# Patient Record
Sex: FEMALE | Race: OTHER
[De-identification: names, ages, dates, MRNs, and addresses within clinical notes are randomized per-mention and may not be internally consistent; named-entity substitution may affect disease eponyms.]

---

## 2019-01-22 ENCOUNTER — HOSPITAL ENCOUNTER (INPATIENT)
Dept: HOSPITAL 15 - ER | Age: 34
LOS: 3 days | Discharge: HOME | DRG: 872 | End: 2019-01-25
Attending: INTERNAL MEDICINE | Admitting: NURSE PRACTITIONER
Payer: SELF-PAY

## 2019-01-22 VITALS — WEIGHT: 184.75 LBS | HEIGHT: 67 IN | BODY MASS INDEX: 29 KG/M2

## 2019-01-22 DIAGNOSIS — L02.413: ICD-10-CM

## 2019-01-22 DIAGNOSIS — I10: ICD-10-CM

## 2019-01-22 DIAGNOSIS — I89.0: ICD-10-CM

## 2019-01-22 DIAGNOSIS — L03.113: ICD-10-CM

## 2019-01-22 DIAGNOSIS — A41.01: Primary | ICD-10-CM

## 2019-01-22 LAB
ALBUMIN SERPL-MCNC: 3 G/DL (ref 3.4–5)
ALCOHOL, URINE: < 3 MG/DL (ref 0–5)
ALP SERPL-CCNC: 75 U/L (ref 45–117)
ALT SERPL-CCNC: 37 U/L (ref 13–56)
AMPHETAMINES UR QL SCN: NEGATIVE
ANION GAP SERPL CALCULATED.3IONS-SCNC: 7 MMOL/L (ref 5–15)
BARBITURATES UR QL SCN: POSITIVE
BENZODIAZ UR QL SCN: NEGATIVE
BILIRUB SERPL-MCNC: 0.4 MG/DL (ref 0.2–1)
BUN SERPL-MCNC: 10 MG/DL (ref 7–18)
BUN/CREAT SERPL: 9.7
BZE UR QL SCN: NEGATIVE
CALCIUM SERPL-MCNC: 8.7 MG/DL (ref 8.5–10.1)
CANNABINOIDS UR QL SCN: NEGATIVE
CHLORIDE SERPL-SCNC: 103 MMOL/L (ref 98–107)
CO2 SERPL-SCNC: 26 MMOL/L (ref 21–32)
GLUCOSE SERPL-MCNC: 115 MG/DL (ref 74–106)
HCT VFR BLD AUTO: 37.9 % (ref 36–46)
HGB BLD-MCNC: 12.8 G/DL (ref 12.2–16.2)
MCH RBC QN AUTO: 27.1 PG (ref 28–32)
MCV RBC AUTO: 80.2 FL (ref 80–100)
NRBC BLD QL AUTO: 0 %
OPIATES UR QL SCN: POSITIVE
PCP UR QL SCN: NEGATIVE
POTASSIUM SERPL-SCNC: 3.9 MMOL/L (ref 3.5–5.1)
PROT SERPL-MCNC: 7.4 G/DL (ref 6.4–8.2)
SODIUM SERPL-SCNC: 136 MMOL/L (ref 136–145)

## 2019-01-22 PROCEDURE — 87205 SMEAR GRAM STAIN: CPT

## 2019-01-22 PROCEDURE — 83605 ASSAY OF LACTIC ACID: CPT

## 2019-01-22 PROCEDURE — 81025 URINE PREGNANCY TEST: CPT

## 2019-01-22 PROCEDURE — 96365 THER/PROPH/DIAG IV INF INIT: CPT

## 2019-01-22 PROCEDURE — 73090 X-RAY EXAM OF FOREARM: CPT

## 2019-01-22 PROCEDURE — 36415 COLL VENOUS BLD VENIPUNCTURE: CPT

## 2019-01-22 PROCEDURE — 73200 CT UPPER EXTREMITY W/O DYE: CPT

## 2019-01-22 PROCEDURE — 87075 CULTR BACTERIA EXCEPT BLOOD: CPT

## 2019-01-22 PROCEDURE — 87070 CULTURE OTHR SPECIMN AEROBIC: CPT

## 2019-01-22 PROCEDURE — 96367 TX/PROPH/DG ADDL SEQ IV INF: CPT

## 2019-01-22 PROCEDURE — 85610 PROTHROMBIN TIME: CPT

## 2019-01-22 PROCEDURE — 80048 BASIC METABOLIC PNL TOTAL CA: CPT

## 2019-01-22 PROCEDURE — 93971 EXTREMITY STUDY: CPT

## 2019-01-22 PROCEDURE — 80307 DRUG TEST PRSMV CHEM ANLYZR: CPT

## 2019-01-22 PROCEDURE — 81001 URINALYSIS AUTO W/SCOPE: CPT

## 2019-01-22 PROCEDURE — 85025 COMPLETE CBC W/AUTO DIFF WBC: CPT

## 2019-01-22 PROCEDURE — 85730 THROMBOPLASTIN TIME PARTIAL: CPT

## 2019-01-22 PROCEDURE — 80202 ASSAY OF VANCOMYCIN: CPT

## 2019-01-22 PROCEDURE — 87077 CULTURE AEROBIC IDENTIFY: CPT

## 2019-01-22 PROCEDURE — 87186 SC STD MICRODIL/AGAR DIL: CPT

## 2019-01-22 PROCEDURE — 87040 BLOOD CULTURE FOR BACTERIA: CPT

## 2019-01-22 PROCEDURE — 80053 COMPREHEN METABOLIC PANEL: CPT

## 2019-01-23 VITALS — DIASTOLIC BLOOD PRESSURE: 92 MMHG | SYSTOLIC BLOOD PRESSURE: 164 MMHG

## 2019-01-23 VITALS — DIASTOLIC BLOOD PRESSURE: 84 MMHG | SYSTOLIC BLOOD PRESSURE: 132 MMHG

## 2019-01-23 VITALS — SYSTOLIC BLOOD PRESSURE: 126 MMHG | DIASTOLIC BLOOD PRESSURE: 51 MMHG

## 2019-01-23 VITALS — DIASTOLIC BLOOD PRESSURE: 104 MMHG | SYSTOLIC BLOOD PRESSURE: 148 MMHG

## 2019-01-23 LAB
APTT PPP: 28.9 SEC (ref 23.78–33.04)
INR PPP: 0.97 (ref 0.9–1.15)
PROTHROMBIN TIME: 10.4 SEC (ref 9.27–12.13)

## 2019-01-23 PROCEDURE — 0X9D3ZZ DRAINAGE OF RIGHT LOWER ARM, PERCUTANEOUS APPROACH: ICD-10-PCS | Performed by: SURGERY

## 2019-01-23 RX ADMIN — VANCOMYCIN HYDROCHLORIDE SCH MLS/HR: 1 INJECTION, POWDER, LYOPHILIZED, FOR SOLUTION INTRAVENOUS at 22:06

## 2019-01-23 RX ADMIN — FAMOTIDINE SCH MG: 20 TABLET, FILM COATED ORAL at 17:25

## 2019-01-23 RX ADMIN — HYDROCODONE BITARTRATE AND ACETAMINOPHEN PRN TAB: 5; 325 TABLET ORAL at 17:25

## 2019-01-23 RX ADMIN — FAMOTIDINE SCH MG: 20 TABLET, FILM COATED ORAL at 07:14

## 2019-01-23 NOTE — NUR
SURGICAL CONSULT



DR DIAZ AT BED SIDE ASSESSING PT RT FOREARM, AND EXPLAIN THE INCISION AND DRAINAGE 
PROCEDURE TO PATIENT, PT VERBALIS UNDERSTANDING.

## 2019-01-23 NOTE — NUR
IV insertion

IV access obtained, via clean sterile technique by inserting 22 gauge catheter at  after  
attempt(s). IV secured properly. No trauma to site. Patient tolerated procedure well.

OLD IV REMOVED, PT SATED ITS HURTING ME

## 2019-01-23 NOTE — NUR
ASSUMED PATIENT CARE- NOC SHIFT

PATIENT IS ALERT AND ORIENTED X4, ANSWERS IN COMPLETE SENTENCES AND MAKES APPROPRIATE EYE 
CONTACT. PATIENT DRESSING TO RIGHT ARM IS DRY AND INTACT. PATIENT DENIES PAIN AT THIS TIME. 
PATIENT IS IN BED, BED IS LOCKED IN LOWEST POSITION. BED RAILS UP X2 AND HEAD OF BED IS UP 
<30 DEGREES FOR SAFETY PRECAUTIONS. BEDSIDE TABLE WITHIN REACH, CALL LIGHT WITHIN REACH. 
INSTRUCTED PATIENT TO CALL PRN. WILL CONTINUE TO MONITOR Q1H AND PRN.

## 2019-01-23 NOTE — NUR
PT IS BACK TO HER ROOM, BY HOSPITAL BED, ALERT ORIENTED X4, DRY KERLIX NOTED ON HER RT 
FOREARM, VITAL SIGNS STABLE, 

PAIN 4/10, CALL LIGHT WITHIN REACH.

## 2019-01-24 VITALS — DIASTOLIC BLOOD PRESSURE: 80 MMHG | SYSTOLIC BLOOD PRESSURE: 133 MMHG

## 2019-01-24 VITALS — DIASTOLIC BLOOD PRESSURE: 89 MMHG | SYSTOLIC BLOOD PRESSURE: 142 MMHG

## 2019-01-24 VITALS — SYSTOLIC BLOOD PRESSURE: 126 MMHG | DIASTOLIC BLOOD PRESSURE: 42 MMHG

## 2019-01-24 VITALS — DIASTOLIC BLOOD PRESSURE: 84 MMHG | SYSTOLIC BLOOD PRESSURE: 127 MMHG

## 2019-01-24 VITALS — SYSTOLIC BLOOD PRESSURE: 127 MMHG | DIASTOLIC BLOOD PRESSURE: 79 MMHG

## 2019-01-24 LAB
ANION GAP SERPL CALCULATED.3IONS-SCNC: 6 MMOL/L (ref 5–15)
BUN SERPL-MCNC: 9 MG/DL (ref 7–18)
BUN/CREAT SERPL: 13.2
CALCIUM SERPL-MCNC: 8.7 MG/DL (ref 8.5–10.1)
CHLORIDE SERPL-SCNC: 106 MMOL/L (ref 98–107)
CO2 SERPL-SCNC: 27 MMOL/L (ref 21–32)
GLUCOSE SERPL-MCNC: 100 MG/DL (ref 74–106)
HCT VFR BLD AUTO: 34.5 % (ref 36–46)
HGB BLD-MCNC: 11.7 G/DL (ref 12.2–16.2)
MCH RBC QN AUTO: 26.7 PG (ref 28–32)
MCV RBC AUTO: 78.6 FL (ref 80–100)
NRBC BLD QL AUTO: 0 %
POTASSIUM SERPL-SCNC: 4.3 MMOL/L (ref 3.5–5.1)
SODIUM SERPL-SCNC: 139 MMOL/L (ref 136–145)

## 2019-01-24 RX ADMIN — FAMOTIDINE SCH MG: 20 TABLET, FILM COATED ORAL at 06:21

## 2019-01-24 RX ADMIN — FAMOTIDINE SCH MG: 20 TABLET, FILM COATED ORAL at 16:50

## 2019-01-24 RX ADMIN — VANCOMYCIN HYDROCHLORIDE SCH MLS/HR: 1 INJECTION, POWDER, LYOPHILIZED, FOR SOLUTION INTRAVENOUS at 10:08

## 2019-01-24 RX ADMIN — HYDROCODONE BITARTRATE AND ACETAMINOPHEN PRN TAB: 5; 325 TABLET ORAL at 04:53

## 2019-01-24 RX ADMIN — HYDROCODONE BITARTRATE AND ACETAMINOPHEN PRN TAB: 5; 325 TABLET ORAL at 16:27

## 2019-01-24 RX ADMIN — VANCOMYCIN HYDROCHLORIDE SCH MLS/HR: 1 INJECTION, POWDER, LYOPHILIZED, FOR SOLUTION INTRAVENOUS at 21:56

## 2019-01-24 RX ADMIN — CEFTRIAXONE SODIUM SCH MLS/HR: 1 INJECTION, POWDER, FOR SOLUTION INTRAMUSCULAR; INTRAVENOUS at 10:03

## 2019-01-24 RX ADMIN — HYDROCODONE BITARTRATE AND ACETAMINOPHEN PRN TAB: 5; 325 TABLET ORAL at 21:56

## 2019-01-24 NOTE — NUR
IV insertion

IV access obtained, via clean sterile technique by inserting  22 gauge catheter at  after  2 
attempt(s). IV secured properly. No trauma to site. Patient tolerated well.

## 2019-01-24 NOTE — NUR
IV removal

PATIENT COMPLAINING OF PAIN AT IV SITE TO LEFT FOREARM

IV DC'd with clean sterile technique, catheter fully intact. Pressure dressing applied to 
site. Patient tolerated well.

## 2019-01-24 NOTE — NUR
Patient complaining of redness and itching to IV site, left forearm. Currently infusing 
Rocephin 1 gram. Medication stopped, flushed with 10 ml's of 0.9% NS. no further complaints 
at this time, V/S /89, 88, 20, 97% 98.4. Dr Rodger velez, awaiting return call, 
will continue to monitor Q1 hour and PRN.

## 2019-01-24 NOTE — NUR
ASSUMED PATIENT CARE - NOC SHIFT

PATIENT IS IN BED. BED IS LOCKED IN LOWEST POSITION. BED RAILS UP X2, HEAD OF BED IS UP >30 
DEGREES FOR SAFETY PRECAUTIONS. BEDSIDE TABLE WITHIN REACH, CALL LIGHT WITHIN REACH. 
DISCUSSED POC WITH PATIENT AND INSTRUCTED PATIENT TO CALL PRN; PATIENT VERBALIZED 
UNDERSTANDING. WILL CONTINUE TO MONITOR Q1H AND PRN. PATIENT STATES THAT SHE FEELS BETTER 
TODAY. SHE STATES THAT HER PAIN IS TOLERABLE AT 4/10.

## 2019-01-24 NOTE — NUR
IV removal

IV DC'd to left forearm due to complains of redness and itching, with clean sterile 
technique, catheter fully intact. Pressure dressing applied to site. Patient tolerated well.

IV insertion

IV access obtained, via clean sterile technique by inserting 20 gauge catheter at right 
wrist after 1 attempt. IV secured properly. No trauma to site. Patient tolerated well.

## 2019-01-24 NOTE — NUR
Opening Shift Note

Assumed care of patient, awake, alert and oriented X4.  No S/S of distress/SOB, complains of 
pain, to right upper arm, 3/10, Ryan Ruiz, verbalized tolerable. Right upper arm dressing 
clean, dry and intact. IV to left forearm, 22 gauge, patent and saline locked. Instructed on 
POC and to call for assist PRN, verbalized understanding. Bed locked, in lowest position, 
call light within reach, will continue to monitor for changes Q1hr and PRN.

## 2019-01-24 NOTE — NUR
Return call received from Dr Soares, updated on reason for call, verbalized understanding. 
New orders received and followed through. Patient remains with red line and itching to left 
forearm, s/p Rocephin infusion. Benadryl 50mg IVP administered. Will continue to monitor Q1 
hour and PRN.

## 2019-01-24 NOTE — NUR
WOUND CARE NOTE: 

Wound care in to see patient  per wound care request regarding Rt forearm wounds s/p I&D. 
Patient is 34 years old female with admitting diagnosis of  R Forearm cellulitis. Bedside 
nurse took photograph of patient's wound upon admission for reference. Patient is resting  
in bed in Rm. 234. She's awake, alert and oriented. Patient is premedicated for pain by her 
bedside nurse prior dressing change. She's ambulatory and self turn and reposition. Her 
current Pete score is 21.  



Patient undergone I&D of Rt arm abscess by Dr. Ragsdale yesterday 1/23/19. Dr. Ragsdale order to 
remove wound packing and irrigate wounds with H2O2 TID. Removed wound dressing to patient's 
Rt forearm. Patient's R forearm has two linear incision. Irrigated wounds with Hydrogen 
Peroxide to wet the packing, removed packing and irrigated again with hydrogen peroxide, 
patted dry with sterile gauze. New photograph of wounds are taken for reference. Covered 
wounds with layers of sterile 4x4's gauze, wrapped with Kerlix and secured with tape.  
Patient tolerated well. No further wound care monitoring needed at this time. Bedside nurse 
to continue TID irrigation of wounds per MD order.



RECOMMENDATION: TID/PRN Irrigation of wounds/dressing change to Rt. forearm wounds per MD 
order, skin/wound preventative plan of care.

-------------------------------------------------------------------------------

Addendum: 01/24/19 at 1830 by Elo Leon RN

-------------------------------------------------------------------------------

Amended: Links added.

## 2019-01-24 NOTE — NUR
ROUNDS

Dr Soares at bedside for rounds, new orders received and followed through. Patient updated 
on plan of care, verbalized understanding.

## 2019-01-25 VITALS — DIASTOLIC BLOOD PRESSURE: 99 MMHG | SYSTOLIC BLOOD PRESSURE: 141 MMHG

## 2019-01-25 VITALS — DIASTOLIC BLOOD PRESSURE: 86 MMHG | SYSTOLIC BLOOD PRESSURE: 150 MMHG

## 2019-01-25 VITALS — SYSTOLIC BLOOD PRESSURE: 142 MMHG | DIASTOLIC BLOOD PRESSURE: 73 MMHG

## 2019-01-25 LAB
ANION GAP SERPL CALCULATED.3IONS-SCNC: 8 MMOL/L (ref 5–15)
BUN SERPL-MCNC: 10 MG/DL (ref 7–18)
BUN/CREAT SERPL: 13
CALCIUM SERPL-MCNC: 8.9 MG/DL (ref 8.5–10.1)
CHLORIDE SERPL-SCNC: 105 MMOL/L (ref 98–107)
CO2 SERPL-SCNC: 26 MMOL/L (ref 21–32)
GLUCOSE SERPL-MCNC: 86 MG/DL (ref 74–106)
POTASSIUM SERPL-SCNC: 3.6 MMOL/L (ref 3.5–5.1)
SODIUM SERPL-SCNC: 139 MMOL/L (ref 136–145)

## 2019-01-25 RX ADMIN — CEFTRIAXONE SODIUM SCH MLS/HR: 1 INJECTION, POWDER, FOR SOLUTION INTRAMUSCULAR; INTRAVENOUS at 09:00

## 2019-01-25 RX ADMIN — HYDROCODONE BITARTRATE AND ACETAMINOPHEN PRN TAB: 5; 325 TABLET ORAL at 07:01

## 2019-01-25 RX ADMIN — FAMOTIDINE SCH MG: 20 TABLET, FILM COATED ORAL at 07:00

## 2019-01-25 RX ADMIN — VANCOMYCIN HYDROCHLORIDE SCH MLS/HR: 1 INJECTION, POWDER, LYOPHILIZED, FOR SOLUTION INTRAVENOUS at 10:08

## 2019-01-25 NOTE — NUR
Patient taken to vehicle via wheelchair with all personal belongings, accompanied by staff 
and family member. No distress noted at time of departure.

## 2019-01-25 NOTE — NUR
ROUNDS

PATIENT IS RESTING, EYES CLOSED. BREATHING IS EVEN AND UNLABORED. WILL CONTINUE TO MONITOR 
Q1H AND PRN.

## 2019-01-25 NOTE — NUR
Discharge instructions given as ordered. Encourage to follow up with PMD as instructed. Pt 
has no pcp, pt was schedule a follow up appt with Dr. Ragsdale on 2/5/2019 at 10:15am at [id number], [phone] ext. [phone]. Pt was given information for [facility] public health 
office. pt  was also given the [facility] urgent care coupon and information. All questions 
and concerns addressed. Patient verbalized understanding.  Medication reconciliation form 
completed and copy given to patient. No home medications held in Pharmacy, and no needed 
vaccines to be given. IV removed with catheter intact, pressure dressing applied.

## 2023-02-06 NOTE — NUR
RECEIVED PT RESTING IN BED, CALL LIGHT WITHIN REACH, PT REPORTS MILD PAIN ON RT ARM OF 3/10, 
RT ARM DRESSING CHANGED. CLEAN WOUNDS WITH NORMAL SALINE, COVERED WITH GAUZE, AND WRAPPED 
WITH KERLIX, WILL CONTINUE TO MONITOR PT. Hide Cetaphil Products: No Continue Regimen: Otc Cetaphil face wash, BPO Gel\\n\\nOral Spironolactone, Tretinoin cream, Clindamycin lotion Detail Level: Zone Other Instructions: Follow up in 1 year continue regimen and advised pt to stay on birth control as this medication can cause birth defects, pt confirms understanding and will continue to use prevention such as oral birth control and male latex condoms Action 3: Continue